# Patient Record
Sex: FEMALE | Race: WHITE | Employment: UNEMPLOYED | URBAN - METROPOLITAN AREA
[De-identification: names, ages, dates, MRNs, and addresses within clinical notes are randomized per-mention and may not be internally consistent; named-entity substitution may affect disease eponyms.]

---

## 2018-06-23 ENCOUNTER — OFFICE VISIT (OUTPATIENT)
Dept: FAMILY MEDICINE CLINIC | Facility: CLINIC | Age: 58
End: 2018-06-23

## 2018-06-23 VITALS
HEART RATE: 70 BPM | OXYGEN SATURATION: 98 % | RESPIRATION RATE: 16 BRPM | TEMPERATURE: 99 F | HEIGHT: 64 IN | SYSTOLIC BLOOD PRESSURE: 124 MMHG | WEIGHT: 125 LBS | BODY MASS INDEX: 21.34 KG/M2 | DIASTOLIC BLOOD PRESSURE: 70 MMHG

## 2018-06-23 DIAGNOSIS — S80.861A INSECT BITE OF RIGHT LOWER EXTREMITY, INITIAL ENCOUNTER: ICD-10-CM
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
## 2018-06-23 DIAGNOSIS — S30.861A INSECT BITE OF ABDOMEN, INITIAL ENCOUNTER: Primary | ICD-10-CM

## 2018-06-23 PROCEDURE — 99202 OFFICE O/P NEW SF 15 MIN: CPT | Performed by: NURSE PRACTITIONER

## 2018-06-23 RX ORDER — ATORVASTATIN CALCIUM 20 MG/1
TABLET, FILM COATED ORAL
Refills: 4 | COMMUNITY
Start: 2018-05-31

## 2018-06-23 RX ORDER — FLUOXETINE 10 MG/1
TABLET, FILM COATED ORAL
Refills: 4 | COMMUNITY
Start: 2018-05-25

## 2018-06-23 NOTE — PROGRESS NOTES
CHIEF COMPLAINT:   Patient presents with:  Rash: X 3 days, itching after gardening. HPI:   Poly Alexander is a 62year old female who presents for evaluation of a rash. Per patient rash started in the past 3 days. Rash has been stable since onset. /70   Pulse 70   Temp 99 °F (37.2 °C) (Tympanic)   Resp 16   Ht 64\"   Wt 125 lb   SpO2 98%   BMI 21.46 kg/m²   GENERAL: well developed, well nourished,in no apparent distress  SKIN: Multiple discrete round erythematous papules less than 1cm located Discussed s/s of secondary infection and importance of prompt medical evaluation. (fever, chills, pain/tenderness, increased erythema or swelling, discharge, streaking)    Do not scratch!     Discussed s/s of worsening infection/condition with Patient and i Insect Sting Allergy, Generalized  You are having an allergic reaction to an insect sting. This may occur after a sting by a wasp, honeybee, yellow jacket, or other insect. This may cause an itchy rash and swelling in the face or other parts of the body.  A · Oral diphenhydramine is an over-the-counter antihistamine available at pharmacies and grocery stores. Unless a prescription antihistamine was given, diphenhydramine may be used to reduce itching if large areas of the skin are involved.  It may make you sl · If any mouthparts of the tick remain in the skin, these can be removed with tweezers.  If you can’t remove the mouth (of a tick) easily with clean tweezers, leave it alone and let the skin heal.  · After the tick is removed, wash the bite area with rubbin · Don't drink from uncovered sweetened drinks in cans when outside. Insects are attracted to soda drink cans and sometimes crawl inside of them. · Don't wear bright colored clothes with flowery prints and patterns when outside.   · Don’t wear perfume when · Headache, fever, chills, muscle or joint aching  · Increased pain or swelling  · Signs of infection of the affected area:  ¨ Spreading redness  ¨ Increase in pain or swelling  ¨ Fluid or colored drainage from the affected site  Date Last Reviewed: 3/1/20

## 2018-06-23 NOTE — PATIENT INSTRUCTIONS
Self-Care for Skin Rashes   A rash is your skin’s reaction to a substance your body is sensitive to. Most rashes can be treated at home by keeping the skin clean and dry. But some rashes may be signs of a more serious problem.  Call your doctor if you notic · Rash, hives, redness, welts, or blisters in areas other than the sting site  · Itching, burning, stinging, pain in areas other than the sting site  · Dry, flaky, cracking, scaly skin  · Swelling in areas other than the sting site   · Stomach pain or cram · You may use acetaminophen or ibuprofen to control pain, unless another pain medicine was prescribed.  Note: If you have chronic liver or kidney disease or ever had a stomach ulcer or gastrointestinal bleeding, talk with your provider before using these me · Scraping the stinger out with the edge of a dull knife or plastic card (credit card). · Don't use a tweezer or your fingers to remove the stinger since that may squeeze more toxin from the stinger.   · Wash the affected area with soap and warm water 2 to · If you are at high risk for another sting due to where you work or play, or if your reaction included dizziness, fainting or trouble breathing or swallowing, an auto-injectable epinephrine may be prescribed.  If not, ask your healthcare provider for one a